# Patient Record
Sex: MALE | Race: ASIAN | NOT HISPANIC OR LATINO | URBAN - METROPOLITAN AREA
[De-identification: names, ages, dates, MRNs, and addresses within clinical notes are randomized per-mention and may not be internally consistent; named-entity substitution may affect disease eponyms.]

---
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
## 2025-05-29 NOTE — PROCEDURE: TREATMENT REGIMEN
report received from previous ED RN. pt resting in stretcher in NAD, RR even and unlabored. easily arousable to verbal stimuli, A&OX4, speech clear. maintained on cardiac monitor and continuous pulse ox. iv fluids infusing well without complications, iv site WNL. iraheta catheter draining well, + urine output noted. safety measures maintained, side rails up x2
Plan: KTP in 2 weeks at 3:30pm, per Caterina, sample spot no charge
Detail Level: Simple